# Patient Record
Sex: FEMALE | Race: WHITE
[De-identification: names, ages, dates, MRNs, and addresses within clinical notes are randomized per-mention and may not be internally consistent; named-entity substitution may affect disease eponyms.]

---

## 2019-12-13 ENCOUNTER — HOSPITAL ENCOUNTER (EMERGENCY)
Dept: HOSPITAL 43 - DL.ED | Age: 44
Discharge: HOME | End: 2019-12-13
Payer: MEDICARE

## 2019-12-13 VITALS — SYSTOLIC BLOOD PRESSURE: 132 MMHG | DIASTOLIC BLOOD PRESSURE: 90 MMHG
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
## 2019-12-13 DIAGNOSIS — E11.9: ICD-10-CM

## 2019-12-13 DIAGNOSIS — Z79.890: ICD-10-CM

## 2019-12-13 DIAGNOSIS — Z79.84: ICD-10-CM

## 2019-12-13 DIAGNOSIS — S01.01XA: Primary | ICD-10-CM

## 2019-12-13 DIAGNOSIS — Z88.4: ICD-10-CM
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
## 2019-12-13 DIAGNOSIS — E03.9: ICD-10-CM

## 2019-12-13 PROCEDURE — 12001 RPR S/N/AX/GEN/TRNK 2.5CM/<: CPT

## 2019-12-13 PROCEDURE — 99283 EMERGENCY DEPT VISIT LOW MDM: CPT

## 2019-12-13 NOTE — EDM.PDOC
ED HPI GENERAL MEDICAL PROBLEM





- General


Chief Complaint: Head Injury


Stated Complaint: FELL AT WORK


Time Seen by Provider: 12/13/19 16:20


Source of Information: Reports: Provider


History Limitations: Reports: No Limitations





- History of Present Illness


INITIAL COMMENTS - FREE TEXT/NARRATIVE: 





This 43 yo female patient was brought to the ED from the [facility] home due to a fall 

and laceration to her posterior head. The patient's caregivers report that they 

don't know exactly what happened, but the patient started yelling. The patient 

required multiple caregivers to keep her calm. The patient has a 2 cm 

laceration to the right posterior scalp. The caregivers do not believe that the 

patient had any loss of consciousness during the incident. The patient does not 

normally allow care givers to get too close to her and does not trust medical 

providers. 


Onset: Today


Duration: Minutes:, Constant


Location: Reports: Head (right posterior scalp)


Quality: Reports: Ache, Dull


Severity: Moderate


Improves with: Reports: None


Worsens with: Reports: None


Context: Reports: Other (ground level fall)


Associated Symptoms: Reports: No Other Symptoms





- Related Data


 Allergies











Allergy/AdvReac Type Severity Reaction Status Date / Time


 


ketamine Allergy  Cannot Verified 12/13/19 16:01





   Remember  











Home Meds: 


 Home Meds





Calcium Carb & Citrate/Vit D3 [Calcium + Vitamin D3 Caplet] 1 each PO BID 04/05/ 14 [History]


Lactulose [Chronulac] 2 tbsp PO BEDTIME 04/05/14 [History]


Levothyroxine Sodium [Synthroid] 50 mcg PO DAILY 04/05/14 [History]


Propranolol [Inderal] 10 mg PO TID 04/05/14 [History]


Valproic Acid (As Sodium Salt) [Valproic Acid] 500 mg PO TID 04/05/14 [History]


lamoTRIgine [LaMICtal] 75 mg PO BID 04/05/14 [History]


medroxyPROGESTERone Acetate [Depo-Provera] 150 mg IM .[id number] 04/05/14 [History]


atorvaSTATin [Lipitor] 1 tab PO BEDTIME 05/26/16 [History]


Benzonatate 200 mg PO TID PRN 06/08/17 [History]


Carbamide Peroxide [Debrox 6.5% Otic Soln] 1 drop EARBOTH ASDIRECTED 06/08/17 [

History]


Loratadine 1 tab PO DAILY PRN 06/08/17 [History]


metFORMIN HCl [Metformin ER Osmotic] 1 tab PO BID 06/08/17 [History]











Past Medical History


HEENT History: Reports: None


Cardiovascular History: Reports: None


Respiratory History: Reports: None


Gastrointestinal History: Reports: None


Genitourinary History: Reports: None


OB/GYN History: Reports: None, Other (See Below)


Other OB/GYN History: IRREGLAR PERIODS


Musculoskeletal History: Reports: None


Neurological History: Reports: Seizure


Psychiatric History: Reports: Other (See Below)


Other Psychiatric History: intellectually disability


Endocrine/Metabolic History: Reports: Diabetes, Type II, Hypothyroidism


Hematologic History: Reports: None


Immunologic History: Reports: None


Oncologic (Cancer) History: Reports: None


Dermatologic History: Reports: None





- Infectious Disease History


Infectious Disease History: Reports: None





- Past Surgical History


Head Surgeries/Procedures: Reports: None


HEENT Surgical History: Reports: None





Social & Family History





- Family History


Family Medical History: Noncontributory





- Tobacco Use


Smoking Status *Q: Never Smoker





- Caffeine Use


Caffeine Use: Reports: None





- Recreational Drug Use


Recreational Drug Use: No





ED ROS GENERAL





- Review of Systems


Review Of Systems: Comprehensive ROS is negative, except as noted in HPI.





ED EXAM, HEAD INJURY





- Physical Exam


Exam: See Below


Exam Limited By: No Limitations


General Appearance: Alert, WD/WN


Head: Scalp Lacerations, Scalp Tenderness


Nexus Criteria: No: Posterior, Midline Cervical Tenderness, Evidence of 

Intoxication, Altered Level of Consciousness, Focal Neurological Deficit, 

Painful Distraction Injuries


Eyes: Bilateral Eye: EOMI, Normal Inspection, PERRL


Ears: Normal External Exam, Normal Canal, Hearing Grossly Normal, Normal TMs


Nose: Normal Inspection, Normal Mucousa, No Blood


Throat/Mouth: Normal Inspection, Normal Lips, Normal Teeth, Normal Gums, Normal 

Oropharynx, Normal Voice, No Airway Compromise


Neck: Non-Tender, Full Range of Motion, Normal Alignment, Normal Inspection


Respiratory: No Respiratory Distress, Lungs Clear, Normal Breath Sounds, No 

Accessory Muscle Use, Chest Non-Tender


Cardiovascular: Normal Peripheral Pulses, Regular Rate, Rhythm, No Edema, No 

Gallop, No JVD, No Murmur, No Rub


GI/Abdominal Exam: Normal Bowel Sounds, Soft, Non-Tender, No Organomegaly, No 

Distention, No Abnormal Bruit, No Mass


 (Female) Exam: Deferred


Rectal (Female) Exam: Deferred


Back Exam: Full Range of Motion, Normal Inspection, NT


Extremities: Normal Inspection, Normal Range of Motion, Non-Tender, No Pedal 

Edema, Normal Capillary Refill


Neurologic: CNs II-XII nml As Tested, No Motor/Sensory Deficits, Alert, Normal 

Mood/Affect, Oriented x 3


Skin: Other (small laceration to right posterior scalp)





- [place] Coma Score


Best Eye Response (Yara): (4) Open Spontaneously


Best Verbal Response ([place]): (5) Oriented


Best Motor Response ([place]): (6) Obeys Commands


[place] Total: 15





ED LACERATION/WOUND & RE PROC





- Laceration/Wound Repair


  ** Right Posterior Head


Lac/wound length in cm: 0.5


Appearance: Subcutaneous


Distal NVT: Other


Skin Prep: Saline


Exploration/Debridement/Repair: Wound Explored, In a Bloodless Field, No 

Foreign Material Found


Closed with: Staples


# of Sutures: 1


Drain Placement: No


Sterile Dressing Applied: None


Tetanus Status Addressed: Yes





Course





- Vital Signs


Last Recorded V/S: 





 Last Vital Signs











Temp  36.9 C   12/13/19 16:10


 


Pulse      


 


Resp  16   12/13/19 16:10


 


BP  132/90   12/13/19 16:10


 


Pulse Ox      














- Orders/Labs/Meds


Meds: 





Medications














Discontinued Medications














Generic Name Dose Route Start Last Admin





  Trade Name Freq  PRN Reason Stop Dose Admin


 


Lorazepam  1 mg  12/13/19 16:32  12/13/19 16:36





  Ativan  PO  12/13/19 16:33  1 mg





  ONETIME ONE   Administration





     





     





     





     














Departure





- Departure


Time of Disposition: 16:54


Disposition: Home, Self-Care 01


Condition: Fair


Clinical Impression: 


Scalp laceration


Qualifiers:


 Encounter type: initial encounter Qualified Code(s): S01.01XA - Laceration 

without foreign body of scalp, initial encounter








- Discharge Information


*PRESCRIPTION DRUG MONITORING PROGRAM REVIEWED*: Not Applicable


*COPY OF PRESCRIPTION DRUG MONITORING REPORT IN PATIENT CORNELIUS: Not Applicable


Instructions:  Stitches, Staples, or Adhesive Wound Closure, Easy-to-Read, 

Laceration Care, Adult, Easy-to-Read


Care Plan Goals: 


The patient and caregivers were advised of the examination results during the 

visit. The patient's caregivers assisted in calming the patient for treatment 

and while the staple was placed. There was good approximation of the skin 

margins. The patient's caregivers were encouraged to continue to monitor the 

patient for any additional symptoms. The patient should have the staple removed 

in about 10 days. The patient was given an oral dose of Ativan (1 mg) while in 

the ED. If the patient has any additional symptoms or concerns, the patient 

should either return to the emergency department or visit her primary care 

facility. 





Sepsis Event Note





- Evaluation


Sepsis Screening Result: No Definite Risk





- Focused Exam


Vital Signs: 





 Vital Signs











  Temp Resp BP


 


 12/13/19 16:10  36.9 C  16  132/90











Date Exam was Performed: 12/13/19


Time Exam was Performed: 16:37

## 2020-05-07 ENCOUNTER — HOSPITAL ENCOUNTER (EMERGENCY)
Dept: HOSPITAL 43 - DL.ED | Age: 45
Discharge: HOME | End: 2020-05-07
Payer: MEDICARE

## 2020-05-07 DIAGNOSIS — R56.9: ICD-10-CM

## 2020-05-07 DIAGNOSIS — Z88.4: ICD-10-CM

## 2020-05-07 DIAGNOSIS — Z79.84: ICD-10-CM

## 2020-05-07 DIAGNOSIS — S01.01XA: Primary | ICD-10-CM

## 2020-05-07 DIAGNOSIS — E11.9: ICD-10-CM

## 2020-05-07 DIAGNOSIS — E03.9: ICD-10-CM
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
## 2020-05-07 DIAGNOSIS — Z79.899: ICD-10-CM

## 2020-05-07 NOTE — EDM.PDOC
ED HPI GENERAL MEDICAL PROBLEM





- General


Chief Complaint: Laceration


Stated Complaint: LACERATION TO HEAD


Time Seen by Provider: 05/07/20 12:50


Source of Information: Reports: RN, RN Notes Reviewed, Other (Caregiver)


History Limitations: Reports: Other (Severe M.R.)





- History of Present Illness


INITIAL COMMENTS - FREE TEXT/NARRATIVE: 


Pt presented from group home by caregiver with c/o laceration to the back of 

her head. Pt is non-verbal and cannot provide any history. No one witness how 

the pt was cut. Tetanus is up to date per caregiver. No other injuries per 

caregiver.


Onset: Today, Unknown/Unsure


Duration: Constant


Location: Reports: Head


Severity: Mild


Improves with: Reports: None


Worsens with: Reports: None


Associated Symptoms: Reports: No Other Symptoms





- Related Data


 Allergies











Allergy/AdvReac Type Severity Reaction Status Date / Time


 


ketamine Allergy  Cannot Verified 05/07/20 12:58





   Remember  











Home Meds: 


 Home Meds





Calcium Carb & Citrate/Vit D3 [Calcium + Vitamin D3 Caplet] 1 each PO BID 04/05/ 14 [History]


Lactulose [Chronulac] 2 tbsp PO BEDTIME 04/05/14 [History]


Levothyroxine Sodium [Synthroid] 50 mcg PO DAILY 04/05/14 [History]


Propranolol [Inderal] 10 mg PO TID 04/05/14 [History]


Valproic Acid (As Sodium Salt) [Valproic Acid] 500 mg PO TID 04/05/14 [History]


lamoTRIgine [LaMICtal] 75 mg PO BID 04/05/14 [History]


medroxyPROGESTERone Acetate [Depo-Provera] 150 mg IM .[id number] 04/05/14 [History]


atorvaSTATin [Lipitor] 1 tab PO BEDTIME 05/26/16 [History]


Benzonatate 200 mg PO TID PRN 06/08/17 [History]


Carbamide Peroxide [Debrox 6.5% Otic Soln] 1 drop EARBOTH ASDIRECTED 06/08/17 [

History]


Loratadine 1 tab PO DAILY PRN 06/08/17 [History]


metFORMIN HCl [Metformin ER Osmotic] 1 tab PO BID 06/08/17 [History]











Past Medical History


HEENT History: Reports: None


Cardiovascular History: Reports: None


Respiratory History: Reports: None


Gastrointestinal History: Reports: None


Genitourinary History: Reports: None


OB/GYN History: Reports: None, Other (See Below)


Other OB/GYN History: IRREGLAR PERIODS


Musculoskeletal History: Reports: None


Neurological History: Reports: Seizure


Psychiatric History: Reports: Other (See Below)


Other Psychiatric History: intellectually disability


Endocrine/Metabolic History: Reports: Diabetes, Type II, Hypothyroidism


Hematologic History: Reports: None


Immunologic History: Reports: None


Oncologic (Cancer) History: Reports: None


Dermatologic History: Reports: None





- Infectious Disease History


Infectious Disease History: Reports: None





- Past Surgical History


Head Surgeries/Procedures: Reports: None


HEENT Surgical History: Reports: None





Social & Family History





- Family History


Family Medical History: Noncontributory





- Caffeine Use


Caffeine Use: Reports: None





- Living Situation & Occupation


Living situation: Reports: Other (Group home)





ED ROS GENERAL





- Review of Systems


Review Of Systems: Unable To Obtain


Reason Not Obtained: non-verbal pt





ED EXAM, SKIN/RASH


Exam: See Below


Exam Limited By: Other (severe M.R.)


General Appearance: Alert, No Apparent Distress


Eye Exam: Bilateral Eye: Normal Inspection


Ears: Normal External Exam


Nose: Normal Inspection


Throat/Mouth: No Airway Compromise


Head: Normocephalic, Other (4cm linear laceration to depth of subcutaneous 

tissue at parietal scalp near the apex/pole of the head, no active bleeding, no 

FB)


Neck: Normal Inspection, Full Range of Motion


Respiratory/Chest: No Respiratory Distress


Neurological: Alert, No Motor/Sensory Deficits


Skin: Warm, Dry





ED SKIN PROCEDURES





- Laceration/Wound Repair


  ** Right Upper Posterior Head


Appearance: Subcutaneous, Linear, Clean


Distal NVT: Neuro & Vascular Intact


Anesthetic Type: Other (None)


Skin Prep: Chlorhexidine (Hibiciens)


Exploration/Debridement/Repair: Wound Explored, In a Bloodless Field, Explored 

to Base, Minimal Debridement, Minimally Undermined


Closed with: [place]


Lac/Wound length In cm: 8


Suture Type: Interrupted


Drain Placement: No


Sterile Dressing Applied: None


Tetanus Status Addressed: Yes


Complications: No





Departure





- Departure


Time of Disposition: 12:57


Disposition: Home, Self-Care 01


Condition: Good


Clinical Impression: 


Scalp laceration


Qualifiers:


 Encounter type: initial encounter Qualified Code(s): S01.01XA - Laceration 

without foreign body of scalp, initial encounter








- Discharge Information


*PRESCRIPTION DRUG MONITORING PROGRAM REVIEWED*: Not Applicable


*COPY OF PRESCRIPTION DRUG MONITORING REPORT IN PATIENT CORNELIUS: Not Applicable


Instructions:  Sutures, Staples, or Adhesive Wound Closure


Forms:  ED Department Discharge


Additional Instructions: 


May shampoo hair after 24 hours.


Follow up with clinic for staple removal in 7 to 10 days.





Sepsis Event Note





- Focused Exam


Date Exam was Performed: 05/07/20


Time Exam was Performed: 13:01

## 2020-10-31 NOTE — EDM.PDOC
ED HPI GENERAL MEDICAL PROBLEM





- General


Chief Complaint: Head Injury


Stated Complaint: FELL HEAD LACERATION


Time Seen by Provider: 10/31/20 19:04


Source of Information: Reports: Other (caretakers)


History Limitations: Reports: Other ([facility] home status)





- History of Present Illness


INITIAL COMMENTS - FREE TEXT/NARRATIVE: 





caretaker states found pt walking around with bleeding from her head. no N/V and

behaving her normal self. 





- Related Data


                                    Allergies











Allergy/AdvReac Type Severity Reaction Status Date / Time


 


ketamine Allergy  Cannot Verified 05/07/20 12:58





   Remember  











Home Meds: 


                                    Home Meds





Calcium Carb & Citrate/Vit D3 [Calcium + Vitamin D3 Caplet] 1 each PO BID 

04/05/14 [History]


Lactulose [Chronulac] 2 tbsp PO BEDTIME 04/05/14 [History]


Levothyroxine Sodium [Synthroid] 50 mcg PO DAILY 04/05/14 [History]


Propranolol [Inderal] 10 mg PO TID 04/05/14 [History]


Valproic Acid (As Sodium Salt) [Valproic Acid] 500 mg PO TID 04/05/14 [History]


lamoTRIgine [LaMICtal] 75 mg PO BID 04/05/14 [History]


medroxyPROGESTERone Acetate [Depo-Provera] 150 mg IM .[id number] 04/05/14 [History]


atorvaSTATin [Lipitor] 1 tab PO BEDTIME 05/26/16 [History]


Benzonatate 200 mg PO TID PRN 06/08/17 [History]


Carbamide Peroxide [Debrox 6.5% Otic Soln] 1 drop EARBOTH ASDIRECTED 06/08/17 

[History]


Loratadine 1 tab PO DAILY PRN 06/08/17 [History]


metFORMIN HCl [Metformin ER Osmotic] 1 tab PO BID 06/08/17 [History]











Past Medical History


HEENT History: Reports: None


Cardiovascular History: Reports: None


Respiratory History: Reports: None


Gastrointestinal History: Reports: None


Genitourinary History: Reports: None


OB/GYN History: Reports: None, Other (See Below)


Other OB/GYN History: IRREGLAR PERIODS


Musculoskeletal History: Reports: None


Neurological History: Reports: Seizure


Psychiatric History: Reports: Other (See Below)


Other Psychiatric History: intellectually disability


Endocrine/Metabolic History: Reports: Diabetes, Type II, Hypothyroidism


Hematologic History: Reports: None


Immunologic History: Reports: None


Oncologic (Cancer) History: Reports: None


Dermatologic History: Reports: None





- Infectious Disease History


Infectious Disease History: Reports: None





- Past Surgical History


Head Surgeries/Procedures: Reports: None


HEENT Surgical History: Reports: None





Social & Family History





- Family History


Family Medical History: Noncontributory





- Tobacco Use


Tobacco Use Status *Q: Never Tobacco User


Second Hand Smoke Exposure: No





- Caffeine Use


Caffeine Use: Reports: None





- Recreational Drug Use


Recreational Drug Use: No





- Living Situation & Occupation


Living situation: Reports: Other (Group home)





ED ROS GENERAL





- Review of Systems


Review Of Systems: Comprehensive ROS is negative, except as noted in HPI.





ED EXAM, HEAD INJURY





- Physical Exam


Exam: See Below


Exam Limited By: Other ([facility] home status)


General Appearance: Alert, WD/WN, Anxious, Mild Distress, Other ([facility] home 

status)


Head: Scalp Lacerations.  No: Bennett's Sign, [place] Eyes


Nexus Criteria: No: Posterior, Midline Cervical Tenderness, Evidence of 

Intoxication, Altered Level of Consciousness, Focal Neurological Deficit, 

Painful Distraction Injuries


Ears: Hearing Grossly Normal


Throat/Mouth: Normal Voice, No Airway Compromise


Neck: Full Range of Motion, Normal Inspection


Respiratory: No Respiratory Distress


Cardiovascular: Regular Rate, Rhythm


GI/Abdominal Exam: Soft, Non-Tender


 (Female) Exam: Deferred


Rectal (Female) Exam: Deferred


Neurologic: Alert, Other (upset [facility] home status)


Skin: Normal Color, Warm/Dry





- Yara Coma Score


Best Eye Response (Yara): (4) Open Spontaneously


Best Verbal Response (Yara): (5) Oriented


Best Motor Response ([place]): (6) Obeys Commands


[place] Total: 15





ED LACERATION/WOUND & RE PROC





- Laceration/Wound Repair


  ** Right Posterior Head


Lac/wound length in cm: 3 (right occiput region)


Appearance: Subcutaneous, Linear, Clean


Skin Prep: Chlorhexidine (Hibiciens)


Exploration/Debridement/Repair: Wound Explored, No Foreign Material Found


Closed with: Staples


# of Sutures: 3


Sterile Dressing Applied: None


Tetanus Status Addressed: Yes


Complications: No





Course





- Vital Signs


Last Recorded V/S: 


                                Last Vital Signs











Temp  36.4 C   10/31/20 18:30


 


Pulse  100   10/31/20 18:30


 


Resp  20   10/31/20 18:30


 


BP      


 


Pulse Ox      














- Orders/Labs/Meds


Meds: 


Medications














Discontinued Medications














Generic Name Dose Route Start Last Admin





  Trade Name Sera  PRN Reason Stop Dose Admin


 


Lorazepam  1 mg  10/31/20 19:01  10/31/20 19:17





  Ativan  IM  10/31/20 19:02  1 mg





  ONETIME ONE   Administration














Departure





- Departure


Time of Disposition: 19:41


Disposition: Home, Self-Care 01


Condition: Good


Clinical Impression: 


Occipital scalp laceration


Qualifiers:


 Encounter type: initial encounter Qualified Code(s): S01.01XA - Laceration 

without foreign body of scalp, initial encounter








- Discharge Information


Instructions:  Sutures, Staples, or Adhesive Wound Closure, Easy-to-Read


Forms:  ED Department Discharge


Additional Instructions: 


1) keep wound clean and dry


2) staple removal 10 days


3) recheck if there is any change or concern


4) tylenol as needed for discomfort








Sepsis Event Note (ED)





- Evaluation


Sepsis Screening Result: No Definite Risk





- Focused Exam


Vital Signs: 


                                   Vital Signs











  Temp Pulse Resp


 


 10/31/20 18:30  36.4 C  100  20

## 2021-08-05 NOTE — EDM.PDOC
Scribed by Jaky Renner 08/05/21 0016 for Michael Villanueva MD





ED HPI GENERAL MEDICAL PROBLEM





- General


Chief Complaint: Head Injury


Stated Complaint: FELL, HIT RIGHT FOREHEAD


Time Seen by Provider: 08/05/21 17:19


Source of Information: Reports: RN, RN Notes Reviewed


History Limitations: Reports: No Limitations





- History of Present Illness


INITIAL COMMENTS - FREE TEXT/NARRATIVE: 


Patient presents today with caregivers. Patient lives in [facility]. Staff states that patient does not cooperate when she is in the 

hospital. They state that in the past when she is here they do as little as 

possible and let her go.  Patient is unwilling to stand on the scale and will 

not let staff get vital signs. Staff states that around 1600 patient fell 

against a paper towel young causing a laceration above her superior right 

eyebrow, staff states that there was no LOC. Staff state that there has been no 

change in patient level of alertness. No active bleeding. Tetanus is up to date.







Onset: Today


Duration: Constant


Location: Reports: Other (superior right eyebrow)


Quality: Reports: Ache


Severity: Moderate


Improves with: Reports: None


Worsens with: Reports: None


Associated Symptoms: Reports: No Other Symptoms





- Related Data


                                    Allergies











Allergy/AdvReac Type Severity Reaction Status Date / Time


 


ketamine Allergy  Cannot Verified 05/07/20 12:58





   Remember  











Home Meds: 


                                    Home Meds





Calcium Carb & Citrate/Vit D3 [Calcium + Vitamin D3 Caplet] 1 each PO BID 

04/05/14 [History]


Lactulose [Chronulac] 2 tbsp PO BEDTIME 04/05/14 [History]


Levothyroxine Sodium [Synthroid] 50 mcg PO DAILY 04/05/14 [History]


Propranolol [Inderal] 10 mg PO TID 04/05/14 [History]


Valproic Acid (As Sodium Salt) [Valproic Acid] 500 mg PO TID 04/05/14 [History]


lamoTRIgine [LaMICtal] 75 mg PO BID 04/05/14 [History]


medroxyPROGESTERone Acetate [Depo-Provera] 150 mg IM .[id number] 04/05/14 [History]


atorvaSTATin [Lipitor] 1 tab PO BEDTIME 05/26/16 [History]


Benzonatate 200 mg PO TID PRN 06/08/17 [History]


Carbamide Peroxide [Debrox 6.5% Otic Soln] 1 drop EARBOTH ASDIRECTED 06/08/17 

[History]


Loratadine 1 tab PO DAILY PRN 06/08/17 [History]


metFORMIN HCl [Metformin ER Osmotic] 1 tab PO BID 06/08/17 [History]











Past Medical History


HEENT History: Reports: None


Cardiovascular History: Reports: None


Respiratory History: Reports: None


Gastrointestinal History: Reports: None


Genitourinary History: Reports: None


OB/GYN History: Reports: None, Other (See Below)


Other OB/GYN History: IRREGLAR PERIODS


Musculoskeletal History: Reports: None


Neurological History: Reports: Seizure


Psychiatric History: Reports: Other (See Below)


Other Psychiatric History: intellectually disability


Endocrine/Metabolic History: Reports: Diabetes, Type II, Hypothyroidism


Hematologic History: Reports: None


Immunologic History: Reports: None


Oncologic (Cancer) History: Reports: None


Dermatologic History: Reports: None





- Infectious Disease History


Infectious Disease History: Reports: None





- Past Surgical History


Head Surgeries/Procedures: Reports: None


HEENT Surgical History: Reports: None





Social & Family History





- Family History


Family Medical History: No Pertinent Family History





- Caffeine Use


Caffeine Use: Reports: None





- Living Situation & Occupation


Living situation: Reports: Other (Group home)





ED ROS GENERAL





- Review of Systems


Review Of Systems: Comprehensive ROS is negative, except as noted in HPI.





ED EXAM, HEAD INJURY





- Physical Exam


Exam: See Below


Exam Limited By: No Limitations


General Appearance: Alert, WD/WN, No Apparent Distress


Head: Normocephalic, Facial Lacerations (1.5cm linear just superior to right 

lateral eyebrow)


Eyes: Bilateral Eye: EOMI, Normal Inspection, PERRL


Ears: Normal External Exam


Nose: Normal Inspection, No Blood


Throat/Mouth: No Airway Compromise


Neck: Normal Inspection


Respiratory: No Respiratory Distress


Neurologic: Alert





Course





- Re-Assessments/Exams


Free Text/Narrative Re-Assessment/Exam: 





08/05/21 17:37


Rt eyebrow laceration: no procedural wound care by physician. Wound cleansed and

 closed with steri-strip by RN.





Departure





- Departure


Time of Disposition: 17:38


Disposition: Home, Self-Care 01


Condition: Good


Clinical Impression: 


Laceration of right eyebrow without complication


Qualifiers:


 Encounter type: initial encounter Qualified Code(s): S01.111A - Laceration 

without foreign body of right eyelid and periocular area, initial encounter








- Discharge Information


*PRESCRIPTION DRUG MONITORING PROGRAM REVIEWED*: Not Applicable


*COPY OF PRESCRIPTION DRUG MONITORING REPORT IN PATIENT CORNELIUS: Not Applicable


Instructions:  Sterile Tape Wound Care


Forms:  ED Department Discharge


Additional Instructions: 


Follow up in clinic if any further problems.





I have read and agree with the documentation that has been completed regarding 

this visit. By signing this record, I attest that the documentation was co

mpleted in my physical presence and is an accurate record of the encounter.